# Patient Record
Sex: FEMALE | Race: WHITE
[De-identification: names, ages, dates, MRNs, and addresses within clinical notes are randomized per-mention and may not be internally consistent; named-entity substitution may affect disease eponyms.]

---

## 2019-11-23 ENCOUNTER — HOSPITAL ENCOUNTER (EMERGENCY)
Dept: HOSPITAL 47 - EC | Age: 29
Discharge: HOME | End: 2019-11-23
Payer: COMMERCIAL

## 2019-11-23 VITALS — RESPIRATION RATE: 16 BRPM

## 2019-11-23 VITALS — TEMPERATURE: 98.7 F | DIASTOLIC BLOOD PRESSURE: 78 MMHG | HEART RATE: 72 BPM | SYSTOLIC BLOOD PRESSURE: 128 MMHG
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
## 2019-11-23 DIAGNOSIS — E07.9: ICD-10-CM

## 2019-11-23 DIAGNOSIS — S63.92XA: Primary | ICD-10-CM

## 2019-11-23 DIAGNOSIS — Z79.890: ICD-10-CM

## 2019-11-23 DIAGNOSIS — S93.602A: ICD-10-CM
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
## 2019-11-23 DIAGNOSIS — Z88.0: ICD-10-CM

## 2019-11-23 PROCEDURE — 71046 X-RAY EXAM CHEST 2 VIEWS: CPT

## 2019-11-23 PROCEDURE — 99284 EMERGENCY DEPT VISIT MOD MDM: CPT
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
